# Patient Record
Sex: MALE | Race: WHITE | NOT HISPANIC OR LATINO | Employment: UNEMPLOYED | URBAN - METROPOLITAN AREA
[De-identification: names, ages, dates, MRNs, and addresses within clinical notes are randomized per-mention and may not be internally consistent; named-entity substitution may affect disease eponyms.]

---

## 2017-01-18 ENCOUNTER — ALLSCRIPTS OFFICE VISIT (OUTPATIENT)
Dept: OTHER | Facility: OTHER | Age: 47
End: 2017-01-18

## 2017-01-18 DIAGNOSIS — I10 ESSENTIAL (PRIMARY) HYPERTENSION: ICD-10-CM

## 2017-01-18 DIAGNOSIS — R94.5 ABNORMAL RESULTS OF LIVER FUNCTION STUDIES: ICD-10-CM

## 2017-01-20 ENCOUNTER — APPOINTMENT (OUTPATIENT)
Dept: LAB | Facility: CLINIC | Age: 47
End: 2017-01-20
Payer: COMMERCIAL

## 2017-01-20 ENCOUNTER — TRANSCRIBE ORDERS (OUTPATIENT)
Dept: LAB | Facility: CLINIC | Age: 47
End: 2017-01-20

## 2017-01-20 DIAGNOSIS — I10 ESSENTIAL (PRIMARY) HYPERTENSION: ICD-10-CM

## 2017-01-20 DIAGNOSIS — R79.89 OTHER SPECIFIED ABNORMAL FINDINGS OF BLOOD CHEMISTRY: ICD-10-CM

## 2017-01-20 DIAGNOSIS — R94.5 ABNORMAL RESULTS OF LIVER FUNCTION STUDIES: ICD-10-CM

## 2017-01-20 LAB
ALBUMIN SERPL BCP-MCNC: 3.2 G/DL (ref 3.5–5)
ALP SERPL-CCNC: 213 U/L (ref 46–116)
ALT SERPL W P-5'-P-CCNC: 82 U/L (ref 12–78)
ANION GAP SERPL CALCULATED.3IONS-SCNC: 7 MMOL/L (ref 4–13)
AST SERPL W P-5'-P-CCNC: 112 U/L (ref 5–45)
BASOPHILS # BLD AUTO: 0.03 THOUSANDS/ΜL (ref 0–0.1)
BASOPHILS NFR BLD AUTO: 0 % (ref 0–1)
BILIRUB SERPL-MCNC: 0.8 MG/DL (ref 0.2–1)
BUN SERPL-MCNC: 5 MG/DL (ref 5–25)
CALCIUM SERPL-MCNC: 8.8 MG/DL (ref 8.3–10.1)
CHLORIDE SERPL-SCNC: 103 MMOL/L (ref 100–108)
CHOLEST SERPL-MCNC: 130 MG/DL (ref 50–200)
CO2 SERPL-SCNC: 27 MMOL/L (ref 21–32)
CREAT SERPL-MCNC: 0.71 MG/DL (ref 0.6–1.3)
EOSINOPHIL # BLD AUTO: 0.02 THOUSAND/ΜL (ref 0–0.61)
EOSINOPHIL NFR BLD AUTO: 0 % (ref 0–6)
ERYTHROCYTE [DISTWIDTH] IN BLOOD BY AUTOMATED COUNT: 13.1 % (ref 11.6–15.1)
GFR SERPL CREATININE-BSD FRML MDRD: >60 ML/MIN/1.73SQ M
GLUCOSE SERPL-MCNC: 103 MG/DL (ref 65–140)
HCT VFR BLD AUTO: 44.1 % (ref 36.5–49.3)
HDLC SERPL-MCNC: 22 MG/DL (ref 40–60)
HGB BLD-MCNC: 15.1 G/DL (ref 12–17)
LDLC SERPL CALC-MCNC: 87 MG/DL (ref 0–100)
LYMPHOCYTES # BLD AUTO: 1.57 THOUSANDS/ΜL (ref 0.6–4.47)
LYMPHOCYTES NFR BLD AUTO: 17 % (ref 14–44)
MCH RBC QN AUTO: 34.6 PG (ref 26.8–34.3)
MCHC RBC AUTO-ENTMCNC: 34.2 G/DL (ref 31.4–37.4)
MCV RBC AUTO: 101 FL (ref 82–98)
MONOCYTES # BLD AUTO: 0.61 THOUSAND/ΜL (ref 0.17–1.22)
MONOCYTES NFR BLD AUTO: 7 % (ref 4–12)
NEUTROPHILS # BLD AUTO: 6.91 THOUSANDS/ΜL (ref 1.85–7.62)
NEUTS SEG NFR BLD AUTO: 76 % (ref 43–75)
NRBC BLD AUTO-RTO: 0 /100 WBCS
PLATELET # BLD AUTO: 204 THOUSANDS/UL (ref 149–390)
PMV BLD AUTO: 11.3 FL (ref 8.9–12.7)
POTASSIUM SERPL-SCNC: 3.7 MMOL/L (ref 3.5–5.3)
PROT SERPL-MCNC: 8.6 G/DL (ref 6.4–8.2)
RBC # BLD AUTO: 4.36 MILLION/UL (ref 3.88–5.62)
SODIUM SERPL-SCNC: 137 MMOL/L (ref 136–145)
TRIGL SERPL-MCNC: 105 MG/DL
TSH SERPL DL<=0.05 MIU/L-ACNC: 3.83 UIU/ML (ref 0.36–3.74)
WBC # BLD AUTO: 9.16 THOUSAND/UL (ref 4.31–10.16)

## 2017-01-20 PROCEDURE — 36415 COLL VENOUS BLD VENIPUNCTURE: CPT

## 2017-01-20 PROCEDURE — 85025 COMPLETE CBC W/AUTO DIFF WBC: CPT

## 2017-01-20 PROCEDURE — 80053 COMPREHEN METABOLIC PANEL: CPT

## 2017-01-20 PROCEDURE — 80061 LIPID PANEL: CPT

## 2017-01-20 PROCEDURE — 84443 ASSAY THYROID STIM HORMONE: CPT

## 2017-01-20 PROCEDURE — 86803 HEPATITIS C AB TEST: CPT

## 2017-01-21 LAB — HCV AB SER QL: NORMAL

## 2017-01-31 ENCOUNTER — ALLSCRIPTS OFFICE VISIT (OUTPATIENT)
Dept: OTHER | Facility: OTHER | Age: 47
End: 2017-01-31

## 2017-03-07 ENCOUNTER — ALLSCRIPTS OFFICE VISIT (OUTPATIENT)
Dept: OTHER | Facility: OTHER | Age: 47
End: 2017-03-07

## 2017-05-05 ENCOUNTER — ALLSCRIPTS OFFICE VISIT (OUTPATIENT)
Dept: OTHER | Facility: OTHER | Age: 47
End: 2017-05-05

## 2017-05-05 DIAGNOSIS — R55 SYNCOPE AND COLLAPSE: ICD-10-CM

## 2017-05-05 DIAGNOSIS — R94.5 ABNORMAL RESULTS OF LIVER FUNCTION STUDIES: ICD-10-CM

## 2017-05-05 DIAGNOSIS — I10 ESSENTIAL (PRIMARY) HYPERTENSION: ICD-10-CM

## 2017-05-11 ENCOUNTER — TRANSCRIBE ORDERS (OUTPATIENT)
Dept: LAB | Facility: CLINIC | Age: 47
End: 2017-05-11

## 2017-05-11 ENCOUNTER — APPOINTMENT (OUTPATIENT)
Dept: LAB | Facility: CLINIC | Age: 47
End: 2017-05-11
Payer: COMMERCIAL

## 2017-05-11 DIAGNOSIS — I10 ESSENTIAL (PRIMARY) HYPERTENSION: ICD-10-CM

## 2017-05-11 DIAGNOSIS — R55 SYNCOPE AND COLLAPSE: ICD-10-CM

## 2017-05-11 LAB
ALBUMIN SERPL BCP-MCNC: 2.9 G/DL (ref 3.5–5)
ALP SERPL-CCNC: 296 U/L (ref 46–116)
ALT SERPL W P-5'-P-CCNC: 34 U/L (ref 12–78)
ANION GAP SERPL CALCULATED.3IONS-SCNC: 4 MMOL/L (ref 4–13)
AST SERPL W P-5'-P-CCNC: 43 U/L (ref 5–45)
BASOPHILS # BLD AUTO: 0.01 THOUSANDS/ΜL (ref 0–0.1)
BASOPHILS NFR BLD AUTO: 0 % (ref 0–1)
BILIRUB SERPL-MCNC: 1.15 MG/DL (ref 0.2–1)
BUN SERPL-MCNC: 5 MG/DL (ref 5–25)
CALCIUM SERPL-MCNC: 7.9 MG/DL (ref 8.3–10.1)
CHLORIDE SERPL-SCNC: 107 MMOL/L (ref 100–108)
CHOLEST SERPL-MCNC: 107 MG/DL (ref 50–200)
CO2 SERPL-SCNC: 30 MMOL/L (ref 21–32)
CREAT SERPL-MCNC: 0.58 MG/DL (ref 0.6–1.3)
EOSINOPHIL # BLD AUTO: 0.12 THOUSAND/ΜL (ref 0–0.61)
EOSINOPHIL NFR BLD AUTO: 3 % (ref 0–6)
ERYTHROCYTE [DISTWIDTH] IN BLOOD BY AUTOMATED COUNT: 14.8 % (ref 11.6–15.1)
GFR SERPL CREATININE-BSD FRML MDRD: >60 ML/MIN/1.73SQ M
GLUCOSE P FAST SERPL-MCNC: 111 MG/DL (ref 65–99)
HCT VFR BLD AUTO: 37.2 % (ref 36.5–49.3)
HDLC SERPL-MCNC: 18 MG/DL (ref 40–60)
HGB BLD-MCNC: 12.2 G/DL (ref 12–17)
LDLC SERPL CALC-MCNC: 73 MG/DL (ref 0–100)
LYMPHOCYTES # BLD AUTO: 0.66 THOUSANDS/ΜL (ref 0.6–4.47)
LYMPHOCYTES NFR BLD AUTO: 17 % (ref 14–44)
MCH RBC QN AUTO: 33.2 PG (ref 26.8–34.3)
MCHC RBC AUTO-ENTMCNC: 32.8 G/DL (ref 31.4–37.4)
MCV RBC AUTO: 101 FL (ref 82–98)
MONOCYTES # BLD AUTO: 0.4 THOUSAND/ΜL (ref 0.17–1.22)
MONOCYTES NFR BLD AUTO: 11 % (ref 4–12)
NEUTROPHILS # BLD AUTO: 2.59 THOUSANDS/ΜL (ref 1.85–7.62)
NEUTS SEG NFR BLD AUTO: 69 % (ref 43–75)
NRBC BLD AUTO-RTO: 0 /100 WBCS
PLATELET # BLD AUTO: 71 THOUSANDS/UL (ref 149–390)
PMV BLD AUTO: 11.7 FL (ref 8.9–12.7)
POTASSIUM SERPL-SCNC: 4.1 MMOL/L (ref 3.5–5.3)
PROT SERPL-MCNC: 6.9 G/DL (ref 6.4–8.2)
RBC # BLD AUTO: 3.67 MILLION/UL (ref 3.88–5.62)
SODIUM SERPL-SCNC: 141 MMOL/L (ref 136–145)
TRIGL SERPL-MCNC: 80 MG/DL
TSH SERPL DL<=0.05 MIU/L-ACNC: 2.38 UIU/ML (ref 0.36–3.74)
WBC # BLD AUTO: 3.79 THOUSAND/UL (ref 4.31–10.16)

## 2017-05-11 PROCEDURE — 82390 ASSAY OF CERULOPLASMIN: CPT

## 2017-05-11 PROCEDURE — 82525 ASSAY OF COPPER: CPT

## 2017-05-11 PROCEDURE — 80053 COMPREHEN METABOLIC PANEL: CPT

## 2017-05-11 PROCEDURE — 36415 COLL VENOUS BLD VENIPUNCTURE: CPT

## 2017-05-11 PROCEDURE — 85025 COMPLETE CBC W/AUTO DIFF WBC: CPT

## 2017-05-11 PROCEDURE — 80061 LIPID PANEL: CPT

## 2017-05-11 PROCEDURE — 84443 ASSAY THYROID STIM HORMONE: CPT

## 2017-05-12 LAB — CERULOPLASMIN SERPL-MCNC: 23.4 MG/DL (ref 16–31)

## 2017-05-13 LAB — COPPER SERPL-MCNC: 96 UG/DL (ref 72–166)

## 2017-05-16 ENCOUNTER — ALLSCRIPTS OFFICE VISIT (OUTPATIENT)
Dept: OTHER | Facility: OTHER | Age: 47
End: 2017-05-16

## 2017-06-30 DIAGNOSIS — R94.5 ABNORMAL RESULTS OF LIVER FUNCTION STUDIES: ICD-10-CM

## 2018-01-12 NOTE — MISCELLANEOUS
Assessment    1  Syncope (780 2) (R55)    Plan  HTN (hypertension), Syncope    · (1) LIPID PANEL, FASTING; Status:Active; Requested QZH:25AWU6847;    Perform:[facility]; NBS:82KHG3455; Ordered;  For:HTN (hypertension), Syncope; Ordered By:Shalom Mcconnell;   · (1) TSH; Status:Active; Requested GTW:52HHR5932;    Perform:[facility]; XXH:62NED0782; Ordered;  For:HTN (hypertension), Syncope; Ordered By:Shalom Mcconnell;  Syncope    · (1) CBC/PLT/DIFF; Status:Active; Requested TJK:39GJM6834;    Perform:[facility]; XDF:52GGF1480; Ordered; For:Syncope; Ordered By:Jennifer Mcconnell;   · (1) CERULOPLASMIN; Status:Active; Requested WIF:84BJD0742;    Perform:[facility]; BQK:70SPP6239; Ordered; For:Syncope; Ordered By:Shalom Mcconnell;   · (1) COMPREHENSIVE METABOLIC PANEL; Status:Active; Requested MLY:10DRV7209;    Perform:[facility]; KBL:02APJ9566; Ordered; For:Syncope; Ordered By:Jennifer Mcconnell;   · (1) COPPER; Status:Active; Requested PFZ:53DAQ4720;    Perform:[facility]; IFU:80CGO8323; Ordered; For:Syncope; Ordered By:Jennifer Mcconnell;   · *1 - [facility]  *  Status: Active  Requested for: 24ZIL7490   Ordered; For: Syncope; Ordered By: Jose Zapata Performed:  Due: 95ECO1052  Care Summary provided  : Yes    Discussion/Summary  Counseling Documentation With Imm: The patient was counseled regarding diagnostic results, instructions for management, risk factor reductions, prognosis, patient and family education, impressions, risks and benefits of treatment options, importance of compliance with treatment  Chief Complaint  Chief Complaint Free Text Note Form: PT WAS IN [facility] LAST WEEK FROM 4/26/-28  PT PASSED OUT AT WORK AND CO WORKERS STATE HE HAD A SEIZURE  PT DENIES SEIZURE  HOSPITAL IS CHECKING 24 HOUR URINE FOR SHEILA, THEY BELIEVE PATIENT HAS [place] DISEASE      History of Present Illness  TCM Communication [facility]:  The patient is being contacted for follow-up after hospitalization  Hospital lab studies are pending and 24 HOUR URINE PENDING  He was hospitalized at [facility]  The dates of hospitalization: 4/26-28/17  He was discharged to home  Medications were not reviewed today  He scheduled a follow up appointment  The patient is currently asymptomatic  Communication performed and completed by       HPI: hospital follow up for passing out and possible seizure activity, 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, pt had an EEG, MRI of the brain, they found "copper deposits" in the brain, 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      Review of Systems  Complete-Male:   Constitutional: no fever and no chills  Cardiovascular: no chest pain and no palpitations  Gastrointestinal: no abdominal pain, no nausea, no vomiting, no constipation and no diarrhea  Genitourinary: no dysuria  Active Problems    1  Acute bronchitis (466 0) (J20 9)   2  Acute maxillary sinusitis (461 0) (J01 00)   3  Depression (311) (F32 9)   4  Elevated LFTs (790 6) (R94 5)   5  HTN (hypertension) (401 9) (I10)   6  Need for influenza vaccination (V04 81) (Z23)   7  Rhonchus (786 7) (R06 89)    Past Medical History    1  History of Anxiety (300 00) (F41 9)   2  History of GERD (gastroesophageal reflux disease) (530 81) (K21 9)   3  History of Possible exposure to STD (V01 6) (Z20 2)    Surgical History    1  History of Arthroscopy Knee Right   2  History of Cervical Vertebral Fusion    Family History  Mother    1  Family history of hypertension (V17 49) (Z82 49)  Father    2  Family history of lung cancer (V16 1) (Z80 1)    Social History    · Full-time employment   ·    · Never a smoker   · No drug use   · Recovering alcoholic (241 06) (G39 79)   · Step son   · Two children    Current Meds   1  Sertraline HCl - 50 MG Oral Tablet; TAKE 1 TABLET DAILY;    Therapy: 55SKY1158 to (Evaluate:24Mai7697)  Requested for: 28TWZ1477; Last   [phone] Ordered    Allergies    1  No Known Drug Allergies    Vitals  Signs   Recorded: 52LOX8437 01:28PM   Temperature: 98 4 F  Heart Rate: 106  Respiration: 18  Systolic: 588  Diastolic: 82  Height: 5 ft 11 in  Weight: 169 lb 4 0 oz  BMI Calculated: 23 61  BSA Calculated: 1 96  O2 Saturation: 97    Physical Exam    Constitutional   General appearance: No acute distress, well appearing and well nourished  Ears, Nose, Mouth, and Throat   External inspection of ears and nose: Normal     Otoscopic examination: Tympanic membrance translucent with normal light reflex  Canals patent without erythema  Nasal mucosa, septum, and turbinates: Normal without edema or erythema  Oropharynx: Normal with no erythema, edema, exudate or lesions  Pulmonary   Respiratory effort: No increased work of breathing or signs of respiratory distress  Auscultation of lungs: Clear to auscultation, equal breath sounds bilaterally, no wheezes, no rales, no rhonci  Cardiovascular   Auscultation of heart: Normal rate and rhythm, normal S1 and S2, without murmurs  Examination of extremities for edema and/or varicosities: Normal     Abdomen   Abdomen: Non-tender, no masses  Lymphatic   Palpation of lymph nodes in neck: No lymphadenopathy      Psychiatric   Mood and affect: Normal          Future Appointments    Date/Time Provider Specialty Site   07/26/2017 06:30 PM Ran DO [facility]     Signatures   Electronically signed by : Marco Antonio Gillis DO; May  5 2017  2:04PM EST                       (Author)

## 2018-01-13 VITALS
DIASTOLIC BLOOD PRESSURE: 82 MMHG | HEIGHT: 71 IN | BODY MASS INDEX: 23.69 KG/M2 | TEMPERATURE: 98.4 F | HEART RATE: 106 BPM | OXYGEN SATURATION: 97 % | SYSTOLIC BLOOD PRESSURE: 120 MMHG | RESPIRATION RATE: 18 BRPM | WEIGHT: 169.25 LBS

## 2018-01-13 VITALS
DIASTOLIC BLOOD PRESSURE: 102 MMHG | WEIGHT: 164.8 LBS | BODY MASS INDEX: 23.07 KG/M2 | HEIGHT: 71 IN | TEMPERATURE: 98.3 F | SYSTOLIC BLOOD PRESSURE: 156 MMHG

## 2018-01-13 VITALS
WEIGHT: 168.13 LBS | DIASTOLIC BLOOD PRESSURE: 84 MMHG | BODY MASS INDEX: 23.54 KG/M2 | TEMPERATURE: 98.1 F | HEIGHT: 71 IN | SYSTOLIC BLOOD PRESSURE: 138 MMHG

## 2018-01-14 VITALS
SYSTOLIC BLOOD PRESSURE: 138 MMHG | DIASTOLIC BLOOD PRESSURE: 82 MMHG | WEIGHT: 170 LBS | BODY MASS INDEX: 23.8 KG/M2 | HEIGHT: 71 IN

## 2018-01-14 VITALS
BODY MASS INDEX: 23.26 KG/M2 | TEMPERATURE: 98.2 F | DIASTOLIC BLOOD PRESSURE: 86 MMHG | SYSTOLIC BLOOD PRESSURE: 144 MMHG | HEIGHT: 71 IN | WEIGHT: 166.13 LBS

## 2018-01-16 NOTE — PROGRESS NOTES
Assessment    1  HTN (hypertension) (401 9) (I10)   2  Elevated LFTs (790 6) (R79 89)   3  Rhonchus (786 7) (R06 89)    Plan  Elevated LFTs    · (1) CBC/PLT/DIFF; Status:Active; Requested for:43Gmy3113;    · (1) COMPREHENSIVE METABOLIC PANEL; Status:Active; Requested for:20Iil4595;    · (1) HEP C ANTIBODY; Status:Active; Requested for:99Qpy3765;    · (1) LIPID PANEL, FASTING; Status:Active; Requested for:63Jqj9495;    · (1) T3 TOTAL; Status:Active; Requested for:80Boq5280;    · (1) T3 UPTAKE; Status:Active; Requested for:15Uai1509;    · (1) T4, FREE; Status:Active; Requested for:25Iod9972;    · (1) TSH; Status:Active; Requested for:27Wvd5820;    · (Q) T4, TOTAL (THYROXINE); Status:Active; Requested for:52Qmj5446;   Rhonchus    · * XR CHEST PA & LATERAL; Status:Active; Requested for:17Bta8591; Unlinked    · Lisinopril 40 MG Oral Tablet    Chief Complaint  PATIENT PRESENTS FOR A YEARLY CHECK UP      History of Present Illness  HPI: pt here complains of high liver enzymes found of lab tests, pt was in [facility] and was taken off all his medications, pt feels well and has no other complaints      Review of Systems    Constitutional: no fever and no chills  Cardiovascular: no chest pain and no palpitations  Respiratory: no shortness of breath and no wheezing  Gastrointestinal: no abdominal pain, no nausea and no vomiting  Active Problems    1   HTN (hypertension) (401 9) (I10)    Past Medical History    · History of Anxiety (300 00) (F41 9)   · History of Depression (311) (F32 9)   · History of GERD (gastroesophageal reflux disease) (530 81) (K21 9)   · History of Possible exposure to STD (V01 6) (Z20 2)    Surgical History    · History of Arthroscopy Knee Right   · History of Cervical Vertebral Fusion    Family History  Mother    · Family history of hypertension (V17 49) (Z82 49)  Father    · Family history of lung cancer (V16 1) (Z80 1)    Social History    · Full-time employment   ·    · Never a smoker   · No drug use   · Recovering alcoholic (920 36) (I92 96)   · Step son   · Two children    Current Meds   1  No Reported Medications Recorded    Allergies    1  No Known Drug Allergies    Vitals   Recorded: 87WIL2931 06:40PM   Temperature 69 9 F   Systolic 990   Diastolic 60   Height 5 ft 11 in   Weight 171 lb 8 oz   BMI Calculated 23 92   BSA Calculated 1 98     Physical Exam    Constitutional   General appearance: No acute distress, well appearing and well nourished  Eyes   Conjunctiva and lids: No erythema, swelling or discharge  Pupils and irises: Equal, round, reactive to light  Ophthalmoscopic examination: Normal fundi and optic discs  Ears, Nose, Mouth, and Throat   Oropharynx: Normal with no erythema, edema, exudate or lesions  Neck   Neck: Supple, symmetric, trachea midline, no masses  Thyroid: Normal, no thyromegaly  Pulmonary   Respiratory effort: No increased work of breathing or signs of respiratory distress  Auscultation of lungs: Abnormal   rhonchi over the left base  Cardiovascular   Auscultation of heart: Normal rate and rhythm, normal S1 and S2, no murmurs  Abdomen   Abdomen: Non-tender, no masses  Liver and spleen: No hepatomegaly or splenomegaly  Anus, perineum, and rectum: Normal sphincter tone, no masses, no prolapse  Lymphatic   Palpation of lymph nodes in neck: No lymphadenopathy  Skin   Skin and subcutaneous tissue: Normal without rashes or lesions         Signatures   Electronically signed by : Luis Ayala DO; May 18 2016  6:57PM EST                       (Author)